# Patient Record
Sex: MALE | Race: WHITE | NOT HISPANIC OR LATINO | URBAN - METROPOLITAN AREA
[De-identification: names, ages, dates, MRNs, and addresses within clinical notes are randomized per-mention and may not be internally consistent; named-entity substitution may affect disease eponyms.]

---

## 2017-08-18 ENCOUNTER — IMPORTED ENCOUNTER (OUTPATIENT)
Dept: URBAN - METROPOLITAN AREA CLINIC 1 | Facility: CLINIC | Age: 62
End: 2017-08-18

## 2017-08-18 PROBLEM — H01.004: Noted: 2017-08-18

## 2017-08-18 PROBLEM — H25.813: Noted: 2017-08-18

## 2017-08-18 PROBLEM — H01.001: Noted: 2017-08-18

## 2017-08-18 PROBLEM — H43.811: Noted: 2017-08-18

## 2017-08-18 PROBLEM — H04.123: Noted: 2017-08-18

## 2017-08-18 PROCEDURE — 92004 COMPRE OPH EXAM NEW PT 1/>: CPT

## 2017-08-18 PROCEDURE — 92015 DETERMINE REFRACTIVE STATE: CPT

## 2017-08-18 NOTE — PATIENT DISCUSSION
1.  Cataract OU: Observe for now without intervention. The patient was advised to contact us if any change or worsening of vision2. Dry Eyes OU -- Recommended to patient to use Artificial Tears BID OU3. Blepharitis anterior type OU - Daily warm compresses and lid scrubs were recommended. 4. 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 MRX for glasses given Return for an appointment in 1 year 30 with Dr. Nikko Salcido.

## 2018-02-21 NOTE — PROCEDURE NOTE: CLINICAL
PROCEDURE NOTE: SLT OS. Diagnosis: POAG, Indeterminate. Anesthesia: Topical. Prep: Alphagan 0.15%. Prior to treatment, risks/benefits/alternatives discussed including infection, loss of vision, hemorrhage, cataract, glaucoma, retinal tears or detachment. Lens:  SLT laser lens with goniosol. Power: 1.2mJ. Total applications: 500. Application 880 degrees. Patient tolerated procedure well. There were no complications. Post-op instructions given. Post-op IOP = * mmHg. Sloane Nam

## 2018-03-14 NOTE — PROCEDURE NOTE: CLINICAL
PROCEDURE NOTE: SLT OD. Diagnosis: POAG, Indeterminate. Anesthesia: Topical. Prep: Alphagan 0.15%. Prior to treatment, risks/benefits/alternatives discussed including infection, loss of vision, hemorrhage, cataract, glaucoma, retinal tears or detachment. Lens:  SLT laser lens with goniosol. Power: 0.9mJ. Total applications: 443. Application 860 degrees. Patient tolerated procedure well. There were no complications. Post-op instructions given. Post-op IOP = 21 mmHg. Fernanda McKean

## 2018-07-11 NOTE — PATIENT DISCUSSION
Glasses with Dr. Eladio Sherman, Glaucoma checks with [facility].

## 2018-11-06 ENCOUNTER — IMPORTED ENCOUNTER (OUTPATIENT)
Dept: URBAN - METROPOLITAN AREA CLINIC 1 | Facility: CLINIC | Age: 63
End: 2018-11-06

## 2018-11-06 PROBLEM — H01.002: Noted: 2018-11-06

## 2018-11-06 PROBLEM — H01.005: Noted: 2018-11-06

## 2018-11-06 PROBLEM — H01.004: Noted: 2018-11-06

## 2018-11-06 PROBLEM — H25.813: Noted: 2018-11-06

## 2018-11-06 PROBLEM — H04.123: Noted: 2018-11-06

## 2018-11-06 PROBLEM — H43.811: Noted: 2018-11-06

## 2018-11-06 PROBLEM — H01.001: Noted: 2018-11-06

## 2018-11-06 PROCEDURE — 92014 COMPRE OPH EXAM EST PT 1/>: CPT

## 2018-11-06 PROCEDURE — 92015 DETERMINE REFRACTIVE STATE: CPT

## 2018-11-06 NOTE — PATIENT DISCUSSION
1.  Cataracts OU -- Observe for now without intervention. The patient was advised to contact us if any change or worsening of vision. 2. Dry Eyes OU -- Recommend the frequent use of OTC AT's BID-QID OU. 3. Anterior Blepharitis OU -- Recommend Hot / Warm Moist Compresses and Lid Scrubs / Baby Shampoo QHS / PRN OU. 4. PVD w/o Tear OD -- Old Stable. RD precautions given. Finalized Glasses MRx & given to patient today. Return for an appointment in 1 YR for a [facility] / [facility] / MRx OU with Dr. Lindsay Rodriguez.

## 2019-04-17 NOTE — PATIENT DISCUSSION
Glasses with Dr. Hailey Michel, Glaucoma checks with [facility].

## 2020-03-27 ENCOUNTER — IMPORTED ENCOUNTER (OUTPATIENT)
Dept: URBAN - METROPOLITAN AREA CLINIC 1 | Facility: CLINIC | Age: 65
End: 2020-03-27

## 2020-03-27 PROBLEM — H04.123: Noted: 2020-03-27

## 2020-03-27 PROBLEM — H01.004: Noted: 2020-03-27

## 2020-03-27 PROBLEM — H01.001: Noted: 2020-03-27

## 2020-03-27 PROBLEM — H43.811: Noted: 2020-03-27

## 2020-03-27 PROBLEM — H25.813: Noted: 2020-03-27

## 2020-03-27 PROCEDURE — 92014 COMPRE OPH EXAM EST PT 1/>: CPT

## 2020-03-27 PROCEDURE — 92015 DETERMINE REFRACTIVE STATE: CPT

## 2020-03-27 NOTE — PATIENT DISCUSSION
1.  Cataract OU -- Observe for now without intervention. 2.  Dry Eyes OU -- Cont ATs BID OU routinely. 3.  Anterior Blepharitis OU -- Cont daily hot compresses and baby shampoo lid scrubs. 4.  PVD w/o Tear OD -- Old stable. RD precautions. Mrx finalized. Return for an appointment in 1 year 27 with Dr. Annabel Ramirez.

## 2021-04-02 ENCOUNTER — IMPORTED ENCOUNTER (OUTPATIENT)
Dept: URBAN - METROPOLITAN AREA CLINIC 1 | Facility: CLINIC | Age: 66
End: 2021-04-02

## 2021-04-02 PROBLEM — H01.004: Noted: 2021-04-02

## 2021-04-02 PROBLEM — H43.811: Noted: 2021-04-02

## 2021-04-02 PROBLEM — H25.813: Noted: 2021-04-02

## 2021-04-02 PROBLEM — H04.123: Noted: 2021-04-02

## 2021-04-02 PROBLEM — H01.001: Noted: 2021-04-02

## 2021-04-02 PROCEDURE — 92015 DETERMINE REFRACTIVE STATE: CPT

## 2021-04-02 PROCEDURE — 92014 COMPRE OPH EXAM EST PT 1/>: CPT

## 2021-04-02 NOTE — PATIENT DISCUSSION
1.  Cataract OU -- Mild progression OU. Visually significant however patient wishes to hold off on surgery for now. Glare & blurred vision complaint obtained from patient today. The patient was advised to contact us if any change or worsening of vision2. Dry Eyes OU -- Cont ATs BID OU routinely. 3.  Anterior Blepharitis OU -- Cont daily hot compresses lid scrubs and washing lids with baby shampoo QHS. 4.  PVD w/o Tear OD -- Stable. RD Precautions. MRx for glasses given to patient. Return for an appointment in 1 year 30/Gl[place] with Dr. Eduardo Platt.

## 2021-11-17 NOTE — PATIENT DISCUSSION
Glasses with Dr. Angelita Cross, Glaucoma check with [facility].

## 2022-04-02 ASSESSMENT — VISUAL ACUITY
OD_GLARE: 20/80
OS_SC: 20/20-2
OD_SC: 20/25
OS_GLARE: 20/80
OS_CC: J1
OD_GLARE: 20/150
OD_CC: J1
OD_CC: J1
OS_SC: 20/25
OS_CC: 20/800
OS_SC: 20/20
OS_GLARE: 20/80
OS_SC: 20/20
OS_CC: J1+
OD_GLARE: 20/80
OD_SC: 20/20-2
OD_CC: J1+
OS_CC: J1
OS_GLARE: 20/150
OD_SC: 20/20
OD_SC: 20/20
OD_CC: J1
OD_CC: 20/800
OS_CC: 20/800
OS_CC: J1
OD_CC: 20/800

## 2022-04-02 ASSESSMENT — TONOMETRY
OD_IOP_MMHG: 17
OD_IOP_MMHG: 18
OD_IOP_MMHG: 17
OS_IOP_MMHG: 17
OD_IOP_MMHG: 18
OS_IOP_MMHG: 18

## 2022-04-02 ASSESSMENT — KERATOMETRY
OD_AXISANGLE_DEGREES: 048
OS_K2POWER_DIOPTERS: 42.75
OS_AXISANGLE_DEGREES: 055
OD_AXISANGLE2_DEGREES: 138
OD_K1POWER_DIOPTERS: 42.50
OS_K1POWER_DIOPTERS: 42.50
OD_K2POWER_DIOPTERS: 42.75
OS_AXISANGLE2_DEGREES: 138

## 2022-05-06 ENCOUNTER — ESTABLISHED PATIENT (OUTPATIENT)
Dept: URBAN - METROPOLITAN AREA CLINIC 1 | Facility: CLINIC | Age: 67
End: 2022-05-06

## 2022-05-06 DIAGNOSIS — H04.123: ICD-10-CM

## 2022-05-06 DIAGNOSIS — H01.021: ICD-10-CM

## 2022-05-06 DIAGNOSIS — H01.024: ICD-10-CM

## 2022-05-06 DIAGNOSIS — H43.811: ICD-10-CM

## 2022-05-06 DIAGNOSIS — H25.813: ICD-10-CM

## 2022-05-06 PROCEDURE — 92015 DETERMINE REFRACTIVE STATE: CPT

## 2022-05-06 PROCEDURE — 92014 COMPRE OPH EXAM EST PT 1/>: CPT

## 2022-05-06 ASSESSMENT — VISUAL ACUITY
OD_CC: J1
OD_BAT: 20/80
OS_BAT: 20/80
OS_CC: J1
OD_CC: 20/20
OS_CC: 20/25+1

## 2022-05-06 ASSESSMENT — KERATOMETRY
OD_AXISANGLE_DEGREES: 048
OS_AXISANGLE_DEGREES: 055
OD_AXISANGLE2_DEGREES: 138
OD_K1POWER_DIOPTERS: 42.50
OS_K2POWER_DIOPTERS: 42.75
OS_AXISANGLE2_DEGREES: 138
OD_K2POWER_DIOPTERS: 42.75
OS_K1POWER_DIOPTERS: 42.50

## 2022-05-06 ASSESSMENT — TONOMETRY
OD_IOP_MMHG: 17
OS_IOP_MMHG: 18

## 2022-07-26 NOTE — PROCEDURE NOTE: CLINICAL
PROCEDURE NOTE: SLT #2 OU. Anesthesia: Topical. Prep: Alphagan 0.15%. Prior to treatment, risks/benefits/alternatives discussed including infection, loss of vision, hemorrhage, cataract, glaucoma, retinal tears or detachment. Lens:  SLT laser lens with goniosol. Power: 1.2mJ. Total applications: 044/586. Application 263 degrees. Patient tolerated procedure well. There were no complications. Post-op instructions given. Post-op IOP = 18/18 mmHg. Jose Collazo

## 2022-10-05 NOTE — PATIENT DISCUSSION
GOOD RESPONE TO SLT OU.  CONTINUE TO FOLLOW W/ DR YANG W/ OCT, HVF AND OPTIC NERVE EVAL.  IF CHANGES, SEND BACK TO DR Gabrielle Freeman.

## 2022-10-18 NOTE — PATIENT DISCUSSION
IOP improved. Flucuation in IOP could be due to use of oral Pred for sinus infection. Will watch carefully. Will add Alphagan P to OS . Pt to call if he can not tolerate.

## 2022-10-18 NOTE — PATIENT DISCUSSION
PT GOING OUT OF [place] UNTIL MARCH. SELF PAY FOR MEDS. PT TO CALL IF HE HAS ANY TOLERANCE ISSUES WITH BRIMONIDINE. 1 5 ML BOTTLE SNET TO [facility]. WILL GET REFILLS OVERSE[place] WITH HIS EYE DOCTOR THERE.

## 2023-05-24 ENCOUNTER — COMPREHENSIVE EXAM (OUTPATIENT)
Dept: URBAN - METROPOLITAN AREA CLINIC 1 | Facility: CLINIC | Age: 68
End: 2023-05-24

## 2023-05-24 DIAGNOSIS — H25.813: ICD-10-CM

## 2023-05-24 DIAGNOSIS — H01.024: ICD-10-CM

## 2023-05-24 DIAGNOSIS — H43.811: ICD-10-CM

## 2023-05-24 DIAGNOSIS — H04.123: ICD-10-CM

## 2023-05-24 DIAGNOSIS — H01.021: ICD-10-CM

## 2023-05-24 PROCEDURE — 92014 COMPRE OPH EXAM EST PT 1/>: CPT

## 2023-05-24 PROCEDURE — 92015 DETERMINE REFRACTIVE STATE: CPT

## 2023-05-24 ASSESSMENT — VISUAL ACUITY
OS_BAT: 20/100
OD_CC: 20/25
OD_CC: J1
OD_BAT: 20/100
OS_CC: 20/25
OS_CC: J2

## 2023-05-24 ASSESSMENT — KERATOMETRY
OS_AXISANGLE2_DEGREES: 138
OD_K1POWER_DIOPTERS: 42.50
OD_AXISANGLE_DEGREES: 048
OS_AXISANGLE_DEGREES: 055
OS_K1POWER_DIOPTERS: 42.50
OD_K2POWER_DIOPTERS: 42.75
OS_K2POWER_DIOPTERS: 42.75
OD_AXISANGLE2_DEGREES: 138

## 2023-05-24 ASSESSMENT — TONOMETRY
OS_IOP_MMHG: 17
OD_IOP_MMHG: 17

## 2024-10-18 ENCOUNTER — COMPREHENSIVE EXAM (OUTPATIENT)
Dept: URBAN - METROPOLITAN AREA CLINIC 1 | Facility: CLINIC | Age: 69
End: 2024-10-18

## 2024-10-18 DIAGNOSIS — H01.021: ICD-10-CM

## 2024-10-18 DIAGNOSIS — H01.024: ICD-10-CM

## 2024-10-18 DIAGNOSIS — H25.813: ICD-10-CM

## 2024-10-18 DIAGNOSIS — H04.123: ICD-10-CM

## 2024-10-18 DIAGNOSIS — H43.811: ICD-10-CM

## 2024-10-18 PROCEDURE — 99214 OFFICE O/P EST MOD 30 MIN: CPT

## 2024-11-01 ENCOUNTER — COMPREHENSIVE EXAM (OUTPATIENT)
Dept: URBAN - METROPOLITAN AREA CLINIC 1 | Facility: CLINIC | Age: 69
End: 2024-11-01

## 2024-11-01 DIAGNOSIS — H52.4: ICD-10-CM

## 2024-11-01 DIAGNOSIS — Z01.00: ICD-10-CM

## 2024-11-01 DIAGNOSIS — H52.223: ICD-10-CM

## 2024-11-01 DIAGNOSIS — H52.13: ICD-10-CM

## 2024-11-01 PROCEDURE — 92014 COMPRE OPH EXAM EST PT 1/>: CPT

## 2024-11-01 PROCEDURE — 92015 DETERMINE REFRACTIVE STATE: CPT

## 2024-12-06 ENCOUNTER — CONTACT LENSES/GLASSES VISIT (OUTPATIENT)
Age: 69
End: 2024-12-06

## 2024-12-06 DIAGNOSIS — H52.223: ICD-10-CM

## 2024-12-06 DIAGNOSIS — H52.13: ICD-10-CM

## 2024-12-06 PROCEDURE — 92015 DETERMINE REFRACTIVE STATE: CPT | Mod: NC
